# Patient Record
Sex: FEMALE | Race: BLACK OR AFRICAN AMERICAN | Employment: STUDENT | URBAN - METROPOLITAN AREA
[De-identification: names, ages, dates, MRNs, and addresses within clinical notes are randomized per-mention and may not be internally consistent; named-entity substitution may affect disease eponyms.]

---

## 2020-09-21 ENCOUNTER — OFFICE VISIT (OUTPATIENT)
Dept: PULMONOLOGY | Age: 20
End: 2020-09-21

## 2020-09-21 VITALS
DIASTOLIC BLOOD PRESSURE: 69 MMHG | HEART RATE: 76 BPM | OXYGEN SATURATION: 99 % | HEIGHT: 66 IN | TEMPERATURE: 98.6 F | RESPIRATION RATE: 16 BRPM | WEIGHT: 175.6 LBS | SYSTOLIC BLOOD PRESSURE: 112 MMHG | BODY MASS INDEX: 28.22 KG/M2

## 2020-09-21 DIAGNOSIS — R06.83 SNORING: Primary | ICD-10-CM

## 2020-09-21 DIAGNOSIS — R51.9 HEADACHE DISORDER: ICD-10-CM

## 2020-09-21 DIAGNOSIS — G47.19 EXCESSIVE DAYTIME SLEEPINESS: ICD-10-CM

## 2020-09-21 DIAGNOSIS — E04.9 ENLARGED THYROID: ICD-10-CM

## 2020-09-21 RX ORDER — BUSPIRONE HYDROCHLORIDE 7.5 MG/1
TABLET ORAL
COMMUNITY
Start: 2020-08-31

## 2020-09-21 NOTE — PROGRESS NOTES
[facility]  Pulmonary, Critical Care, and Sleep Medicine    Office Progress Note- Initial Evaluation      Primary Care Physician: No primary care provider on file. Reason for Visit:  Evaluation for possible sleep breathing disorder    Assessment:  1. Snoring with Excessive Daytime Sleepiness (EDS): Patient has symptoms and exam findings highly suggestive of a sleep breathing disorder, such as TRINO. Given severity of symptoms and comorbidities additional i sleep testing is indicated. 2. Tonsillar Hypertrophy: without acute inflammation or recurrent infections  3. Headaches- daily: March 2020-current  4. Enlarged Thyroid by palpation- non-tender, Patient reports feeling hot when other are cold. Unclear if clinical significance at this time  5. OCP use       Plan:  · Order Thyroid US for further evaluation  · Schedule patient for home sleep study for further evaluation. · Potential consequences of untreated sleep apnea, and/or excessive daytime sleepiness were discussed with the patient. · Educational materials provided. · Treatment options including CPAP, dental appliance, weight reduction measures, positional therapy, surgeries etc were discussed. ·  and workplace safety reviewed and discussed as appropriate. Drowsy and/or inattentive driving should be avoided. COVID-19 precautions discussed to include: wearing mask in public, handwashing, social distancing and sleeping in a separate bedroom when using PAP therapy. · Follow-up in after sleep testing , sooner should new symptoms or problems arise. · Follow up with PCP for routine health care maintenance and as directed. History of Present Illness: Ms. Sepideh Bob is a 23 y.o. female patient who presents for evaluation for possible Obstructive Sleep Apnea (TRINO). The history was provided by the patient.     Occupation:     Childcare [employer]                    Work Schedule:MWF: 6406-4558, T, TH: 8605-6063      Driving: yes  Drowsy Driving: is not reported. Motor vehicle accident(s) associated with drowsy driving have not occurred. Snoring: This is a Chronic problem which has been ongoing for years. Witnessed apneas are not reported. Fatigue: This is a Chronic problem which has been ongoing for years. Dental: Teeth clenching or grinding is not reported. Naps: are reported if she can. Naps tend to be spontaneously and last 1-2 hours. Patient awakens awakens feeling unrefreshed. Leg Symptoms/Pain: She does not have unpleasant or crawling sensation in legs or strong urge to move when inactive. GERD: is not reported. Mood: Happy but can be shelton. Sleep-Wake History:       Estimates sleeping approximately 8-9 hours per night/day. Reports sleeping in a Bed with 1 pillows under their head. She gets into bed at approximately 1040-7934. Once in bed,she watches TV. It usually takes up to 30 minutes to fall asleep after going to bed. Reports waking up to use the bathroom 1-2 times nightly. Pain, typically does not disturb their sleep. Vivid dreams are reported about twice monthly. She normally awakens with an alarm to start their day at MWF: 0930  T,TH 0510. She  typically gets out of bed 10 minutes after her alarm goes off . Waking up with a morning headache is reported every morning. Patient has a history a migraine. Headaches are reported to be sharp, frontal and posterior-right. No auras. No nausea or emesis associated with headaches. No photophobia. \"Nothing\" makes it better. Getting overheated makes headaches worse. Awakening with a dry mouth is reported. Symptom(s) suggestive of cataplexy are not reported. Sleep paralysis is not  reported. Hypnagogic and/or hypnopompic hallucinations are not reported. Sleep walking is not  reported. Sleep talking is not  reported. Other unusual and/or parasomnia behaviors are not reported.     Family Sleep History:  - Father: TRINO- Uses a PAP device        Stop [place] 9/21/2020   Does the patient snore loudly (louder than talking or loud enough to be heard through closed doors)? 0   Does the patient often feel tired, fatigued, or sleepy during the daytime, even after a \"good\" night's sleep? 1   Has anyone ever observed the patient stop breathing during their sleep?  0   Does the patient have or are they being treated for high blood pressure? 0   Is the patient's BMI greater than 35? 0   Is your neck circumference greater than 17 inches (Male) or 16 inches (Female)? 0   Is the patient older than 48? 0   Is the patient male? 0   TRINO Score 1       3 most recent PHQ Screens 9/21/2020   Little interest or pleasure in doing things Not at all   Feeling down, depressed, irritable, or hopeless Not at all   Total Score PHQ 2 0       Bolton Scale 9/21/2020   Sitting and Reading 1   Watching TV 2   Sitting, inactive in a public place (e.g. a movie theater or meeting) 0   As a passenger in a car for an hour, without a break 2   Lying down to rest in the afternoon, when circumstances permit 3   Sitting and talking to someone 0   Sitting quietly after lunch without alcohol 0   In a car, while stopped for a few minutes in traffic 0   Bolton Sleepiness Score 8             Immunization History: There is no immunization history on file for this patient.     Past Medical History:  Past Medical History:   Diagnosis Date    Enlarged tonsils     Migraine        Past Surgical History:  Past Surgical History:   Procedure Laterality Date    HX CYST REMOVAL      Gluteal Cysts removal -age 3 per patient    HX WISDOM TEETH EXTRACTION  2020       Family History:  Family History   Problem Relation Age of Onset    Hypertension Father     Sleep Apnea Father     Diabetes Other        Social History:  Social History     Tobacco Use    Smoking status: Never Smoker    Smokeless tobacco: Never Used   Substance Use Topics    Alcohol use: Yes     Frequency: Monthly or less     Drinks per session: 3 or 4     Binge frequency: Less than monthly    Drug use: Never        Caffeine Amount Time of last Intake Comments   Coffee 1 C/week     Soda 2x/week  Coke or Pepsi   Tea None     Energy Drinks None     Over- the - counter stimulant pills None     Other Substances      Alcohol 3-4/month  Whiskey   Tobacco Never     Drugs None     Other:          Medications:  Current Outpatient Medications on File Prior to Visit   Medication Sig Dispense Refill    busPIRone (BUSPAR) 7.5 mg tablet        No current facility-administered medications on file prior to visit.        - Patient also reports taking unknown Oral Contraceptive Pill    Allergy:  No Known Allergies      Review of Systems  General ROS: positive for  - fatigue and sleep disturbance  negative for - chills, fever, hot flashes, malaise, night sweats, weight gain or weight loss  ENT ROS: positive for - headaches  negative for - epistaxis, nasal congestion, nasal discharge, nasal polyps, oral lesions, sinus pain, sneezing, sore throat, tinnitus, vertigo, visual changes or vocal changes  Hematological and Lymphatic ROS: negative for - bleeding problems, blood clots, bruising, jaundice, pallor or swollen lymph nodes  Endocrine ROS: negative for - polydipsia/polyuria, skin changes, + temperature intolerance - feels hot when everyone in her family feels cold, or unexpected weight changes  Respiratory ROS: no cough, shortness of breath, or wheezing  Cardiovascular ROS: positive for - chest pain- random- sharp- lasting few second; ongoing since March 2020- no other associate symptoms  negative for - edema, irregular heartbeat, loss of consciousness, murmur, orthopnea, palpitations, paroxysmal nocturnal dyspnea, rapid heart rate or shortness of breath  Gastrointestinal ROS: no abdominal pain, change in bowel habits, or black or bloody stools  Genito-Urinary ROS: no dysuria, trouble voiding, or hematuria  Musculoskeletal ROS: negative  Neurological ROS: no TIA or stroke symptoms- reports some feelings of lightheadedness- no syncope or pre-syncope. Occurs more when physically active  Dermatological ROS: negative for - pruritus, rash or skin lesion changes   Psychological ROS: negative   Otherwise negative. Physical Exam:  Blood pressure 112/69, pulse 76, temperature 98.6 °F (37 °C), temperature source Oral, resp. rate 16, height 5' 6\" (1.676 m), weight 79.7 kg (175 lb 9.6 oz), SpO2 99 %. on RA, Body mass index is 28.34 kg/m². Neck circ. in \"inches\": 14.5     LMP: Current    General: No distress, acyanotic, appears stated age, cooperative, pleasant  HEENT: PERRL, EOMI, throat without erythema or exudate, Tongue- dental indention on tongue, Mallampati's score 2+, Uvula- midline, Tonsils- 3,   Neck: Supple,  no abnormally enlarged lymph nodes, thyroid is enlarged by palpation, non-tender, No JVD  Chest: Normal.  Lungs: Moderate air entry, clear to auscultation bilaterally,   Heart:  Regular rate and rhythm, S1S2 present, without murmur. Abdomen: Flat,  abdomen is soft without significant tenderness, or guarding. Extremity: Negative for cyanosis, edema, or clubbing. Normal nail beds  Skin: Skin color, texture, turgor normal. No rashes or lesions. Neurological: CN 2-12 grossly intact, normal muscle tone. Data Reviewed:  CBC: No results found for: WBC, WBCLT, HGBPOC, HGB, HGBP, HCTPOC, HCT, PHCT, RBCH, PLT, MCV, HGBEXT, HCTEXT, PLTEXT, HGBEXT, HCTEXT, PLTEXT    BMP: No results found for: NA, K, CL, CO2, AGAP, GLU, BUN, CREA, BUCR, GFRAA, GFRNA, CA, GFRAA     TSH:  No results found for: TSH, TSH2, TSH3, TSHP, TSHELE, TSHEXT, TSHEXT    Imaging:  [x]I have personally reviewed the patients radiographs section   No results found for this or any previous visit. No results found for this or any previous visit. Historical Sleep Testing Data: No Testing To Date.           Ernesto Colon DO, FCCP  Pulmonary, Sleep and Critical Care Medicine

## 2020-09-21 NOTE — PROGRESS NOTES
Elina Campos presents today for   Chief Complaint   Patient presents with    Sleep Problem       Is someone accompanying this pt? No    Is the patient using any DME equipment during OV? No    -DME Company None    Depression Screening:  3 most recent PHQ Screens 9/21/2020   Little interest or pleasure in doing things Not at all   Feeling down, depressed, irritable, or hopeless Not at all   Total Score PHQ 2 0       Learning Assessment:  Learning Assessment 9/21/2020   PRIMARY LEARNER Patient   PRIMARY LANGUAGE ENGLISH   LEARNER PREFERENCE PRIMARY DEMONSTRATION   ANSWERED BY Patient   RELATIONSHIP SELF       Abuse Screening:  No flowsheet data found. Fall Risk  No flowsheet data found. Coordination of Care:  1. Have you been to the ER, urgent care clinic since your last visit? Hospitalized since your last visit? No    2. Have you seen or consulted any other health care providers outside of the [address] [place] [address] since your last visit? Include any pap smears or colon screening.  Dr Yan Oviedo

## 2020-09-25 ENCOUNTER — HOSPITAL ENCOUNTER (OUTPATIENT)
Dept: ULTRASOUND IMAGING | Age: 20
Discharge: HOME | End: 2020-09-25
Attending: INTERNAL MEDICINE
Payer: OTHER GOVERNMENT

## 2020-09-25 DIAGNOSIS — E04.9 ENLARGED THYROID: ICD-10-CM

## 2020-09-25 PROCEDURE — 76536 US EXAM OF HEAD AND NECK: CPT

## 2020-10-06 ENCOUNTER — TRANSCRIBE ORDER (OUTPATIENT)
Dept: SLEEP MEDICINE | Age: 20
End: 2020-10-06

## 2020-10-06 DIAGNOSIS — R06.83 SNORING: ICD-10-CM

## 2020-10-06 DIAGNOSIS — G47.33 OSA (OBSTRUCTIVE SLEEP APNEA): Primary | ICD-10-CM

## 2020-10-06 DIAGNOSIS — R51.9 HEADACHE DISORDER: ICD-10-CM

## 2020-10-06 DIAGNOSIS — G47.19 EXCESSIVE DAYTIME SLEEPINESS: ICD-10-CM

## 2020-10-19 ENCOUNTER — HOSPITAL ENCOUNTER (OUTPATIENT)
Dept: SLEEP MEDICINE | Age: 20
Discharge: HOME | End: 2020-10-19
Payer: OTHER GOVERNMENT

## 2020-10-19 DIAGNOSIS — G47.19 EXCESSIVE DAYTIME SLEEPINESS: ICD-10-CM

## 2020-10-19 DIAGNOSIS — R51.9 HEADACHE DISORDER: ICD-10-CM

## 2020-10-19 DIAGNOSIS — R06.83 SNORING: ICD-10-CM

## 2020-10-19 DIAGNOSIS — G47.33 OSA (OBSTRUCTIVE SLEEP APNEA): ICD-10-CM

## 2020-10-19 PROCEDURE — 95806 SLEEP STUDY UNATT&RESP EFFT: CPT

## 2020-10-20 ENCOUNTER — DOCUMENTATION ONLY (OUTPATIENT)
Dept: PULMONOLOGY | Age: 20
End: 2020-10-20

## 2020-10-20 ENCOUNTER — HOSPITAL ENCOUNTER (OUTPATIENT)
Dept: SLEEP MEDICINE | Age: 20
Discharge: HOME | End: 2020-10-20
Attending: INTERNAL MEDICINE
Payer: OTHER GOVERNMENT

## 2020-10-21 NOTE — PROGRESS NOTES
The patient underwent sleep testing on 10/19/2020. Recommendations listed below. Please refer to full report for specific details. Interpretation   No findings of Obstructive Sleep Apnea     Other non-invasive treatment options are recommended were applicable and include  the following: weight reduction, smoking cessation, body position training, and modification of  alcohol ingestion and/or sedating agents.  Healthy sleep habits are encouraged.  Individuals are encouraged to obtain 7-9 hours of sleep per day.   safety is encouraged. Drowsy and/or inattentive driving should be avoided.  COVID-19 precautions are recommended by the American Academy of Sleep Medicine  and Centers for Disease Control (CDC)   Follow up with referring provider.   Electronically signed by Dr Carla Ochoa, [address] [phone]  10/20/2020 7:17pm (PST)    Carla Ochoa DO, [facility]    [facility]  Pulmonary, Critical Care, and Sleep Medicine

## 2020-11-03 ENCOUNTER — TELEPHONE (OUTPATIENT)
Dept: PULMONOLOGY | Age: 20
End: 2020-11-03

## 2020-11-03 NOTE — TELEPHONE ENCOUNTER
Per Dr. Alem Arndt request I called and spoke with patient. Explained to her that her sleep study showed no TRINO and should continue to follow up with her PCP, Stu Story. I did explain that continuous good sleep hygiene & safe driving procedures are always important and requested she call our office if she has any additional questions or concerns. Patient verbalized understanding at this time.

## 2021-08-16 ENCOUNTER — HOSPITAL ENCOUNTER (OUTPATIENT)
Dept: LAB | Age: 21
Discharge: HOME | End: 2021-08-16
Payer: OTHER GOVERNMENT

## 2021-08-16 LAB
BASOPHILS # BLD: 0 K/UL (ref 0–0.1)
BASOPHILS NFR BLD: 1 % (ref 0–2)
DIFFERENTIAL METHOD BLD: NORMAL
EOSINOPHIL # BLD: 0.2 K/UL (ref 0–0.4)
EOSINOPHIL NFR BLD: 2 % (ref 0–5)
ERYTHROCYTE [DISTWIDTH] IN BLOOD BY AUTOMATED COUNT: 11.9 % (ref 11.6–14.5)
HCT VFR BLD AUTO: 37.7 % (ref 35–45)
HGB BLD-MCNC: 12.1 G/DL (ref 12–16)
LYMPHOCYTES # BLD: 2.4 K/UL (ref 0.9–3.6)
LYMPHOCYTES NFR BLD: 31 % (ref 21–52)
MCH RBC QN AUTO: 27.9 PG (ref 24–34)
MCHC RBC AUTO-ENTMCNC: 32.1 G/DL (ref 31–37)
MCV RBC AUTO: 86.9 FL (ref 74–97)
MONOCYTES # BLD: 0.8 K/UL (ref 0.05–1.2)
MONOCYTES NFR BLD: 10 % (ref 3–10)
NEUTS SEG # BLD: 4.5 K/UL (ref 1.8–8)
NEUTS SEG NFR BLD: 57 % (ref 40–73)
PLATELET # BLD AUTO: 311 K/UL (ref 135–420)
PMV BLD AUTO: 9.7 FL (ref 9.2–11.8)
RBC # BLD AUTO: 4.34 M/UL (ref 4.2–5.3)
WBC # BLD AUTO: 7.9 K/UL (ref 4.6–13.2)

## 2021-08-16 PROCEDURE — 85025 COMPLETE CBC W/AUTO DIFF WBC: CPT

## 2021-08-16 PROCEDURE — 82785 ASSAY OF IGE: CPT

## 2021-08-16 PROCEDURE — 86003 ALLG SPEC IGE CRUDE XTRC EA: CPT

## 2021-08-16 PROCEDURE — 36415 COLL VENOUS BLD VENIPUNCTURE: CPT

## 2021-08-18 LAB
A ALTERNATA IGE QN: <0.1 KU/L
A FUMIGATUS IGE QN: <0.1 KU/L
AMER ROACH IGE QN: <0.1 KU/L
AMER SYCAMORE IGE QN: 2.23 KU/L
BAHIA GRASS IGE QN: 18.3 KU/L
BERMUDA GRASS IGE QN: 5.78 KU/L
BOXELDER IGE QN: 1.13 KU/L
C HERBARUM IGE QN: <0.1 KU/L
CAT DANDER IGG QN: <0.1 KU/L
CLASS DESCRIPTION, 600268: ABNORMAL
COMMON RAGWEED IGE QN: 0.25 KU/L
D FARINAE IGE QN: <0.1 KU/L
D PTERONYSS IGE QN: <0.1 KU/L
DEPRECATED IGE QN: 0.13 KU/L
DOG DANDER IGE QN: <0.1 KU/L
ENGL PLANTAIN IGE QN: 0.54 KU/L
IGE SERPL-ACNC: 242 IU/ML (ref 6–495)
JOHNSON GRASS IGE QN: 7.02 KU/L
M RACEMOSUS IGE QN: <0.1 KU/L
MT JUNIPER IGE QN: 0.24 KU/L
MUGWORT IGE QN: 0.18 KU/L
NETTLE IGE QN: 0.39 KU/L
P NOTATUM IGE QN: <0.1 KU/L
S BOTRYOSUM IGE QN: <0.1 KU/L
SHEEP SORREL IGE QN: 0.54 KU/L
SWEET GUM IGE QN: 4.8 KU/L
TIMOTHY IGE QN: 36.2 KU/L
WHITE BIRCH IGE QN: 21.2 KU/L
WHITE ELM IGG QN: 1.21 KU/L
WHITE HICKORY IGE QN: 4.68 KU/L
WHITE MULBERRY IGE QN: <0.1 KU/L
WHITE OAK IGE QN: 26.9 KU/L

## 2023-05-17 RX ORDER — BUSPIRONE HYDROCHLORIDE 7.5 MG/1
TABLET ORAL
COMMUNITY
Start: 2020-08-31